# Patient Record
Sex: MALE | Race: WHITE | Employment: FULL TIME | URBAN - METROPOLITAN AREA
[De-identification: names, ages, dates, MRNs, and addresses within clinical notes are randomized per-mention and may not be internally consistent; named-entity substitution may affect disease eponyms.]

---

## 2017-10-10 ENCOUNTER — HOSPITAL ENCOUNTER (OUTPATIENT)
Dept: ULTRASOUND IMAGING | Age: 67
Discharge: HOME | End: 2017-10-10
Attending: FAMILY MEDICINE
Payer: COMMERCIAL

## 2017-10-10 DIAGNOSIS — N50.89 TESTICULAR MASS: ICD-10-CM

## 2017-10-10 PROCEDURE — 76870 US EXAM SCROTUM: CPT

## 2019-02-04 ENCOUNTER — ANESTHESIA (OUTPATIENT)
Dept: ENDOSCOPY | Age: 69
End: 2019-02-04
Payer: MEDICARE

## 2019-02-04 ENCOUNTER — ANESTHESIA EVENT (OUTPATIENT)
Dept: ENDOSCOPY | Age: 69
End: 2019-02-04
Payer: MEDICARE

## 2019-02-04 ENCOUNTER — HOSPITAL ENCOUNTER (OUTPATIENT)
Age: 69
Setting detail: OUTPATIENT SURGERY
Discharge: HOME | End: 2019-02-04
Attending: SPECIALIST | Admitting: SPECIALIST
Payer: MEDICARE

## 2019-02-04 VITALS
WEIGHT: 182 LBS | RESPIRATION RATE: 14 BRPM | TEMPERATURE: 97.8 F | HEIGHT: 71 IN | DIASTOLIC BLOOD PRESSURE: 73 MMHG | HEART RATE: 49 BPM | SYSTOLIC BLOOD PRESSURE: 129 MMHG | BODY MASS INDEX: 25.48 KG/M2 | OXYGEN SATURATION: 97 %

## 2019-02-04 PROCEDURE — 76060000032 HC ANESTHESIA 0.5 TO 1 HR: Performed by: SPECIALIST

## 2019-02-04 PROCEDURE — 74011250637 HC RX REV CODE- 250/637: Performed by: SPECIALIST

## 2019-02-04 PROCEDURE — 74011000250 HC RX REV CODE- 250

## 2019-02-04 PROCEDURE — 74011250636 HC RX REV CODE- 250/636

## 2019-02-04 PROCEDURE — 77030027957 HC TBNG IRR ENDOGTR BUSS -B: Performed by: SPECIALIST

## 2019-02-04 PROCEDURE — 76040000007: Performed by: SPECIALIST

## 2019-02-04 PROCEDURE — 88305 TISSUE EXAM BY PATHOLOGIST: CPT

## 2019-02-04 PROCEDURE — 77030013992 HC SNR POLYP ENDOSC BSC -B: Performed by: SPECIALIST

## 2019-02-04 RX ORDER — MIDAZOLAM HYDROCHLORIDE 1 MG/ML
.25-1 INJECTION, SOLUTION INTRAMUSCULAR; INTRAVENOUS
Status: DISCONTINUED | OUTPATIENT
Start: 2019-02-04 | End: 2019-02-04 | Stop reason: HOSPADM

## 2019-02-04 RX ORDER — EPINEPHRINE 0.1 MG/ML
1 INJECTION INTRACARDIAC; INTRAVENOUS
Status: DISCONTINUED | OUTPATIENT
Start: 2019-02-04 | End: 2019-02-04 | Stop reason: HOSPADM

## 2019-02-04 RX ORDER — FENTANYL CITRATE 50 UG/ML
200 INJECTION, SOLUTION INTRAMUSCULAR; INTRAVENOUS
Status: DISCONTINUED | OUTPATIENT
Start: 2019-02-04 | End: 2019-02-04 | Stop reason: HOSPADM

## 2019-02-04 RX ORDER — SODIUM CHLORIDE 9 MG/ML
INJECTION, SOLUTION INTRAVENOUS
Status: DISCONTINUED | OUTPATIENT
Start: 2019-02-04 | End: 2019-02-04 | Stop reason: HOSPADM

## 2019-02-04 RX ORDER — LORAZEPAM 2 MG/ML
2 INJECTION INTRAMUSCULAR AS NEEDED
Status: DISCONTINUED | OUTPATIENT
Start: 2019-02-04 | End: 2019-02-04 | Stop reason: HOSPADM

## 2019-02-04 RX ORDER — SODIUM CHLORIDE 0.9 % (FLUSH) 0.9 %
5-40 SYRINGE (ML) INJECTION EVERY 8 HOURS
Status: DISCONTINUED | OUTPATIENT
Start: 2019-02-04 | End: 2019-02-04 | Stop reason: HOSPADM

## 2019-02-04 RX ORDER — FLUMAZENIL 0.1 MG/ML
0.2 INJECTION INTRAVENOUS
Status: DISCONTINUED | OUTPATIENT
Start: 2019-02-04 | End: 2019-02-04 | Stop reason: HOSPADM

## 2019-02-04 RX ORDER — SODIUM CHLORIDE 9 MG/ML
50 INJECTION, SOLUTION INTRAVENOUS CONTINUOUS
Status: DISCONTINUED | OUTPATIENT
Start: 2019-02-04 | End: 2019-02-04 | Stop reason: HOSPADM

## 2019-02-04 RX ORDER — DUTASTERIDE 0.5 MG/1
0.5 CAPSULE, LIQUID FILLED ORAL DAILY
COMMUNITY

## 2019-02-04 RX ORDER — GLYCOPYRROLATE 0.2 MG/ML
INJECTION INTRAMUSCULAR; INTRAVENOUS AS NEEDED
Status: DISCONTINUED | OUTPATIENT
Start: 2019-02-04 | End: 2019-02-04 | Stop reason: HOSPADM

## 2019-02-04 RX ORDER — ATROPINE SULFATE 0.1 MG/ML
0.5 INJECTION INTRAVENOUS
Status: DISCONTINUED | OUTPATIENT
Start: 2019-02-04 | End: 2019-02-04 | Stop reason: HOSPADM

## 2019-02-04 RX ORDER — SODIUM CHLORIDE 0.9 % (FLUSH) 0.9 %
5-40 SYRINGE (ML) INJECTION AS NEEDED
Status: DISCONTINUED | OUTPATIENT
Start: 2019-02-04 | End: 2019-02-04 | Stop reason: HOSPADM

## 2019-02-04 RX ORDER — NALOXONE HYDROCHLORIDE 0.4 MG/ML
0.4 INJECTION, SOLUTION INTRAMUSCULAR; INTRAVENOUS; SUBCUTANEOUS
Status: DISCONTINUED | OUTPATIENT
Start: 2019-02-04 | End: 2019-02-04 | Stop reason: HOSPADM

## 2019-02-04 RX ORDER — PROPOFOL 10 MG/ML
INJECTION, EMULSION INTRAVENOUS AS NEEDED
Status: DISCONTINUED | OUTPATIENT
Start: 2019-02-04 | End: 2019-02-04 | Stop reason: HOSPADM

## 2019-02-04 RX ORDER — DEXTROMETHORPHAN/PSEUDOEPHED 2.5-7.5/.8
1.2 DROPS ORAL
Status: DISCONTINUED | OUTPATIENT
Start: 2019-02-04 | End: 2019-02-04 | Stop reason: HOSPADM

## 2019-02-04 RX ADMIN — PROPOFOL 20 MG: 10 INJECTION, EMULSION INTRAVENOUS at 07:46

## 2019-02-04 RX ADMIN — SODIUM CHLORIDE: 9 INJECTION, SOLUTION INTRAVENOUS at 07:34

## 2019-02-04 RX ADMIN — PROPOFOL 30 MG: 10 INJECTION, EMULSION INTRAVENOUS at 07:40

## 2019-02-04 RX ADMIN — PROPOFOL 30 MG: 10 INJECTION, EMULSION INTRAVENOUS at 07:49

## 2019-02-04 RX ADMIN — PROPOFOL 100 MG: 10 INJECTION, EMULSION INTRAVENOUS at 07:34

## 2019-02-04 RX ADMIN — PROPOFOL 20 MG: 10 INJECTION, EMULSION INTRAVENOUS at 07:44

## 2019-02-04 RX ADMIN — PROPOFOL 30 MG: 10 INJECTION, EMULSION INTRAVENOUS at 07:42

## 2019-02-04 RX ADMIN — GLYCOPYRROLATE 0.2 MG: 0.2 INJECTION INTRAMUSCULAR; INTRAVENOUS at 07:44

## 2019-02-04 RX ADMIN — PROPOFOL 40 MG: 10 INJECTION, EMULSION INTRAVENOUS at 07:37

## 2019-02-04 NOTE — ANESTHESIA POSTPROCEDURE EVALUATION
Post-Anesthesia Evaluation and Assessment Patient: Collette Lassiter. MRN: [id number]  SSN: [id number] YOB: 1950  Age: 80 y.o. Sex: male I have evaluated the patient and they are stable and ready for discharge from the PACU. Cardiovascular Function/Vital Signs Visit Vitals /64 Pulse (!) 53 Temp 36.8 °C (98.3 °F) Resp 14 Ht 5' 11\" (1.803 m) Wt 82.6 kg (182 lb) SpO2 100% BMI 25.38 kg/m² Patient is status post MAC anesthesia for Procedure(s): 
COLONOSCOPY 
ENDOSCOPIC POLYPECTOMY. Nausea/Vomiting: None Postoperative hydration reviewed and adequate. Pain: 
Pain Scale 1: Numeric (0 - 10) (02/04/19 8988) Pain Intensity 1: 0 (02/04/19 8428) Managed Neurological Status: At baseline Mental Status, Level of Consciousness: Alert and  oriented to person, place, and time Pulmonary Status:  
O2 Device: Nasal cannula (02/04/19 0752) Adequate oxygenation and airway patent Complications related to anesthesia: None Post-anesthesia assessment completed. No concerns Signed By: Setve Krishna MD   
 February 4, 2019 Procedure(s): 
COLONOSCOPY 
ENDOSCOPIC POLYPECTOMY. 
 
<BSHSIANPOST> Visit Vitals /64 Pulse (!) 53 Temp 36.8 °C (98.3 °F) Resp 14 Ht 5' 11\" (1.803 m) Wt 82.6 kg (182 lb) SpO2 100% BMI 25.38 kg/m²

## 2019-02-04 NOTE — PROGRESS NOTES

## 2019-02-04 NOTE — ANESTHESIA PREPROCEDURE EVALUATION
Anesthetic History No history of anesthetic complications Review of Systems / Medical History Patient summary reviewed, nursing notes reviewed and pertinent labs reviewed Pulmonary Within defined limits Neuro/Psych Within defined limits Cardiovascular Hypertension Exercise tolerance: >4 METS 
  
GI/Hepatic/Renal 
  
 
 
 
 
 
 Endo/Other Arthritis Other Findings Physical Exam 
 
Airway Mallampati: I 
TM Distance: > 6 cm Neck ROM: normal range of motion Mouth opening: Normal 
 
 Cardiovascular Rhythm: regular Rate: normal 
 
 
 
 Dental 
No notable dental hx Pulmonary Breath sounds clear to auscultation Abdominal 
 
 
 
 Other Findings Anesthetic Plan ASA: 2 Anesthesia type: MAC Induction: Intravenous Anesthetic plan and risks discussed with: Patient

## 2019-02-04 NOTE — DISCHARGE INSTRUCTIONS
Yana Gar.  [id number]  1950    COLON DISCHARGE INSTRUCTIONS  Discomfort:  Redness at IV site- apply warm compress to area; if redness or soreness persist- contact your physician  There may be a slight amount of blood passed from the rectum  Gaseous discomfort- walking, belching will help relieve any discomfort  You may not operate a vehicle for 12 hours  You may not engage in an occupation involving machinery or appliances for rest of today  You may not drink alcoholic beverages for at least 12 hours  Avoid making any critical decisions for at least 24 hour  DIET:   Regular diet. - however -  remember your colon is empty and a heavy meal will produce gas. Avoid these foods:  vegetables, fried / greasy foods, carbonated drinks for today. ACTIVITY:  You may resume your normal daily activities it is recommended that you spend the remainder of the day resting -  avoid any strenuous activity. CALL M.D. ANY SIGN OF:  Increasing pain, nausea, vomiting  Abdominal distension (swelling)  New increased bleeding (oral or rectal)  Fever (chills)  Pain in chest area  Bloody discharge from nose or mouth  Shortness of breath    You may not  take any Advil, Aspirin, Ibuprofen, Motrin, Aleve, Goodys, or any similar pain or arthritis products for 5 days, ONLY  Tylenol as needed for pain. Follow-up Instructions:   Call Dr. Renetta Rosales  Results of procedure / biopsy in 10-14days   Office telephone for problems or questions [phone]      - Await pathology. - Repeat colonoscopy in 5 years.      - If < 10 years, reason: above average risk patient

## 2019-02-04 NOTE — ROUTINE PROCESS
Gabriel Asia.  1950  [id number]    Situation:  Verbal report received from: AUGUST Guzmán. Procedure: Procedure(s):  COLONOSCOPY  ENDOSCOPIC POLYPECTOMY    Background:    Preoperative diagnosis: PERSONAL HISTORY OF COLON POLYPS  Postoperative diagnosis: 1. Colon Polyp    :  Dr. Maylin Vazquez  Assistant(s): Endoscopy Technician-1: Silver Noel  Endoscopy RN-1: Angie Dc RN    Specimens:   ID Type Source Tests Collected by Time Destination   1 : Hepatic Flexure Colon Polyp Preservative   Bob Roman MD 2/4/2019 7677 Pathology     H. Pylori  no    Assessment:  Intra-procedure medications       Anesthesia gave intra-procedure sedation and medications, see anesthesia flow sheet yes    Intravenous fluids:   300  NS @ KVO     Vital signs stable yes    Abdominal assessment: round and soft yes    Recommendation:  Discharge patient per MD order yes.   Return to floor no  Family or Friend : friend  Permission to share finding with family or friend yes

## 2019-02-04 NOTE — PROCEDURES
Colonoscopy Procedure Note    Indications:   Personal history of colon polyps (screening only)  Referring Physician: Francisco Lucas MD   Anesthesia/Sedation:MAC  Endoscopist:  Dr. Heidy Nevarez  Assistant:  Endoscopy Technician-1: Silvia Majano  Endoscopy RN-1: Atif Lopez RN    Preoperative diagnosis: PERSONAL HISTORY OF COLON POLYPS    Postoperative diagnosis: 1. Colon Polyp      Procedure in Detail:  Informed consent was obtained for the procedure, including sedation. Risks of perforation, hemorrhage, adverse drug reaction, and aspiration were discussed. The patient was placed in the left lateral decubitus position. Based on the pre-procedure assessment, including review of the patient's medical history, medications, allergies, and review of systems, he had been deemed to be an appropriate candidate for moderate sedation; he was therefore sedated with the medications listed above. The patient was monitored continuously with ECG tracing, pulse oximetry, blood pressure monitoring, and direct observations. A rectal examination was performed. The [id number] was inserted into the rectum and advanced under direct vision to the terminal ileum. The quality of the colonic preparation was excellent. A careful inspection was made as the colonoscope was withdrawn, including a retroflexed view of the rectum; findings and interventions are described below. Findings:   Rectum: normal  Sigmoid: normal  Descending Colon: normal  Transverse Colon: normal  Ascending Colon: 3 mm hepatic flexure polyp   Cecum: normal  Terminal Ileum: normal    Specimens:     see above    EBL: None    Complications: None; patient tolerated the procedure well. Recommendations:     - Await pathology. - Repeat colonoscopy in 5 years.      - If < 10 years, reason: above average risk patient    Signed By: Anderson Kimball MD                        February 4, 2019

## 2019-02-04 NOTE — H&P
Pre-endoscopy H and P for Colonoscopy    The patient was seen and examined. Date of last colonoscopy: 2014, Polyps  No      The airway was assessed and documented. The problem list, past medical history, and medications were reviewed. There is no problem list on file for this patient. Social History     Socioeconomic History    Marital status:      Spouse name: Not on file    Number of children: Not on file    Years of education: Not on file    Highest education level: Not on file   Social Needs    Financial resource strain: Not on file    Food insecurity - worry: Not on file    Food insecurity - inability: Not on file    Transportation needs - medical: Not on file   [employer] needs - non-medical: Not on file   Occupational History    Not on file   Tobacco Use    Smoking status: Never Smoker   Substance and Sexual Activity    Alcohol use: Yes     Comment: socially    Drug use: Not on file    Sexual activity: Not on file   Other Topics Concern    Not on file   Social History Narrative    Not on file     Past Medical History:   Diagnosis Date    Enlarged prostate     Hypertension     Rheumatoid arthritis ([facility] [place] [address]      The patient has a family history of na    Prior to Admission Medications   Prescriptions Last Dose Informant Patient Reported? Taking? FOLIC ACID PO 0/7/5956 at Unknown time  Yes Yes   Sig: Take  by mouth. METHOTREXATE 2/3/2019 at Unknown time  Yes Yes   Sig: by Does Not Apply route. SPIRONOLACTONE PO 2/3/2019 at Unknown time  Yes Yes   Sig: Take  by mouth. amlodipine besylate (AMLODIPINE PO) 2/4/2019 at Unknown time  Yes Yes   Sig: Take  by mouth. dutasteride (AVODART PO) 2/3/2019 at Unknown time  Yes Yes   Sig: Take  by mouth.       Facility-Administered Medications: None         The review of systems is:  negative for shortness of breath or chest pain      The heart, lungs and mental status were satisfactory for the administration of MAC sedation and for the procedure. Mallampati score: See Anesthesia. I discussed with the patient the objectives, risks, consequences and alternatives to the procedure. Plan: Endoscopic procedure with MAC sedation.     Chanell Sims MD  2/4/2019  7:30 AM

## 2019-09-26 ENCOUNTER — HOSPITAL ENCOUNTER (OUTPATIENT)
Dept: CT IMAGING | Age: 69
Discharge: HOME | End: 2019-09-26
Attending: INTERNAL MEDICINE
Payer: MEDICARE

## 2019-09-26 DIAGNOSIS — I15.2 ADRENAL HYPERTENSION (HCC): ICD-10-CM

## 2019-09-26 DIAGNOSIS — E26.9 HYPERALDOSTERONISM, UNSPECIFIED (HCC): ICD-10-CM

## 2019-09-26 DIAGNOSIS — E27.9 ADRENAL HYPERTENSION (HCC): ICD-10-CM

## 2019-09-26 PROCEDURE — 74176 CT ABD & PELVIS W/O CONTRAST: CPT

## 2020-02-07 ENCOUNTER — OFFICE VISIT (OUTPATIENT)
Dept: DERMATOLOGY | Facility: AMBULATORY SURGERY CENTER | Age: 70
End: 2020-02-07

## 2020-02-07 DIAGNOSIS — C44.42 SQUAMOUS CELL CARCINOMA OF SCALP: Primary | ICD-10-CM

## 2020-02-07 DIAGNOSIS — Z85.820 PERSONAL HISTORY OF MALIGNANT MELANOMA OF SKIN: ICD-10-CM

## 2020-02-07 RX ORDER — FOLIC ACID 1 MG/1
TABLET ORAL
COMMUNITY
Start: 2017-04-05

## 2020-02-07 RX ORDER — SPIRONOLACTONE 100 MG/1
100 TABLET, FILM COATED ORAL DAILY
COMMUNITY
Start: 2018-10-30

## 2020-02-07 RX ORDER — AMLODIPINE AND OLMESARTAN MEDOXOMIL 10; 20 MG/1; MG/1
TABLET ORAL DAILY
COMMUNITY
Start: 2018-08-20 | End: 2021-10-23

## 2020-02-07 RX ORDER — DUTASTERIDE 0.5 MG/1
CAPSULE, LIQUID FILLED ORAL
COMMUNITY
Start: 2018-08-20 | End: 2020-02-07 | Stop reason: SDUPTHER

## 2020-02-07 RX ORDER — METHOTREXATE 2.5 MG/1
TABLET ORAL
COMMUNITY
Start: 2017-05-10 | End: 2020-02-07 | Stop reason: SDUPTHER

## 2020-02-07 NOTE — PROGRESS NOTES
Josee Zhang. is a 77 y.o. male presents to the office today with the following:  Chief Complaint   Patient presents with    Other     Consult        No Known Allergies  Current Outpatient Medications   Medication Sig    amLODIPine-Olmesartan 10-20 mg tab Take  by mouth daily.  folic acid (FOLVITE) 1 mg tablet TK 1 T PO QD    spironolactone (ALDACTONE) 100 mg tablet Take 100 mg by mouth daily.  dutasteride (AVODART) 0.5 mg capsule Take 0.5 mg by mouth daily.  METHOTREXATE 2.5 mg by Does Not Apply route daily. No current facility-administered medications for this visit. Past Medical History:   Diagnosis Date    Benign prostate hyperplasia     Conn's syndrome (HCC)     Enlarged prostate     Hypertension     Rheumatoid arthritis ([facility] [address]      Past Surgical History:   Procedure Laterality Date    COLONOSCOPY N/A 2/4/2019    COLONOSCOPY performed by Rayray Bui MD at [facility] ENDOSCOPY     Social History     Socioeconomic History    Marital status:      Spouse name: Not on file    Number of children: Not on file    Years of education: Not on file    Highest education level: Not on file   Tobacco Use    Smoking status: Never Smoker    Smokeless tobacco: Never Used   Substance and Sexual Activity    Alcohol use: Yes     Comment: socially     No family history on file. 70-year-old  male presents for consultation prior to undergoing Mohs micrographic surgery to treat a biopsy-proven squamous cell carcinoma on the scalp. The lesion was recently biopsied by Caroline Mayorga MD.  He has not any problems with the biopsy site since that time and his lesion has never otherwise been treated in the past.  This is his first skin cancer, however he has had many lesions frozen in the past.  This lesion was asymptomatic prior to biopsy. He does not take any aspirin or anticoagulants. He does not have hepatitis C or HIV.   He does not have any implanted devices, artificial heart valves or joints. He does not have any allergies to antibiotics or lidocaine. Review of Systems   Constitutional: Negative for chills, fever and weight loss. Eyes: Negative for blurred vision. Respiratory: Negative for shortness of breath. Cardiovascular: Negative for chest pain and leg swelling. Gastrointestinal: Negative for diarrhea, nausea and vomiting. Musculoskeletal: Negative for joint pain. Neurological: Negative for headaches. Endo/Heme/Allergies: Does not bruise/bleed easily. Physical Exam  HENT:      Head: Normocephalic. Pulmonary:      Effort: Pulmonary effort is normal.   Lymphadenopathy:      Head:      Right side of head: No submental, submandibular, preauricular, posterior auricular or occipital adenopathy. Left side of head: No submental, submandibular, preauricular, posterior auricular or occipital adenopathy. Cervical: No cervical adenopathy. Upper Body:      Right upper body: No supraclavicular adenopathy. Left upper body: No supraclavicular adenopathy. Skin:     Comments: On the posterior scalp there is an erythematous macule with a small scaly papule at the center. There are scattered scaly papules on the scalp. Neurological:      Mental Status: He is alert and oriented to person, place, and time. 1. Squamous cell carcinoma of scalp    Given the size, location and indistinct clinical margins of the tumor the appropriate treatment is Mohs micrographic surgery. I discussed the procedure with the patient today including the risks such as bleeding, scar, infection, damage to underlying sensory/motor nerves and blood vessels. We discussed expectations for day of surgery, immediate postop recovery and long-term scar maturation. I outlined the clinical margins of the tumor to show the patient in the mirror, and the patient agrees that the appropriate site is identified.   I also discussed potential repair options including second intention healing and primary closure. Additional reconstructive options that were discussed included referral to facial plastics. All of the patient's questions were answered today and I am happy to speak via telephone regarding any additional questions. 2. Personal history of malignant melanoma of skin  Personal history of skin cancer. I discussed sun protection, sunscreen use, the warning signs of skin cancer, the need for self-skin examinations, and the need for regular physician exams every 6 months. The patient should follow up sooner as needed if new, changing, or symptomatic skin lesions arise. Follow-up and Dispositions    · Return in about 1 month (around 3/7/2020) for Mohs.          Sebastian Rocha MD

## 2020-03-05 ENCOUNTER — OFFICE VISIT (OUTPATIENT)
Dept: DERMATOLOGY | Facility: AMBULATORY SURGERY CENTER | Age: 70
End: 2020-03-05

## 2020-03-05 VITALS — DIASTOLIC BLOOD PRESSURE: 80 MMHG | SYSTOLIC BLOOD PRESSURE: 130 MMHG | HEART RATE: 60 BPM

## 2020-03-05 DIAGNOSIS — C44.42 SQUAMOUS CELL CARCINOMA OF SCALP: Primary | ICD-10-CM

## 2020-03-05 NOTE — PATIENT INSTRUCTIONS
WOUND CARE INSTRUCTIONS 1. Keep the dressing clean and dry and do not remove for 48 hours. 2. Then change the dressing once a day as follows: 
a. Wash hands before and after each dressing change. b. Remove dressing and wash site gently with mild soap and water, rinse, and pat dry. 
c. Apply liberal amounts of an ointment (Petroleum jelly or Aquaphor). d. Apply a non-stick (Telfa) dressing or Band-Aid to cover the wound. 3. Watch for: BLEEDING: A small amount of drainage may occur. If bleeding occurs, elevate and rest the surgery site. Apply gauze and steady pressure for 20 minutes. If bleeding continues repeat pressure once more. If bleeding still does not stop, call this office. If after hours, call Dr. Jeff Perez at [phone]. INFECTION: Signs of infection include increased redness, pain, warmth, drainage of pus, and fever. If this occurs, please call this office. 4. Special Instructions (follow any that are checked): 
· [] You have stitches that DO/ DO NOT need to be removed. · [x] Avoid bending at the waist and heavy lifting for two days. · [x] Sleep with your head elevated for the next two nights. · [] Rest the surgery site and keep it elevated as much as possible for two days. · [] You may apply an ice-pack for 10-15 minutes every waking hour for the rest of the day. · [] Eat a soft diet and avoid hot food and hot drinks for the rest of the day. · [] Other instructions: Follow up as directed. Take Tylenol for pain as needed. Once the site is healed with no remaining bandages or open areas, protect your surgical site and scar from the sun, as this area will be more sensitive. Use a broad spectrum sunscreen SPF 30 or higher daily, and a chemical free product (one containing zinc oxide or titanium dioxide) is a good choice if the area is sensitive.  
 
You may begin to gently massage the surgical site in 3 weeks, rubbing in a circular motion along the scar. This can help reduce swelling and thickness of a scar. If you have any questions or concerns, please call our office Monday through Friday at [phone]. If after hours, please call Dr. Arti Bush at [phone].

## 2020-03-05 NOTE — PROGRESS NOTES
Progress note for Mohs surgery patient:    Chief Complaint:  Squamous cell carcinoma of the Right superior parietal scalp    HPI:  Margarette Rod is a 77y.o. year old male referred by  Kunal Chao MD for Mohs surgery to treat the following lesion:  Lesion Info  Location: Right superior parietal scalp  Size: 0.6 x 0.5 cm  Type: Squamous  Duration: less than one year, patient not certain  Path Lab: [facility] #: [id number]  Prior Treatment: none     Symptoms of the lesion include none. ROS:  Jose J Palomino is feeling well and in their usual state of health today. He is not in pain. He does not have any other skin concerns. Exam:  Jose J Palomino is an awake, alert, oriented, well-appearing male in no distress. The face was examined. Findings are:  On the right superior parietal scalp there is a small pink scar. A/P:  Squamous cell carcinoma of the Right superior parietal scalp. The diagnosis was reviewed. The Mohs surgery procedure was reviewed. Indications, risks, and options were discussed with Mr. Katelynn Rodriguez preoperatively. Risks including, but not limited to: pain, bleeding, infection, tumor recurrence, scarring and damage to motor and/or sensory nerves, were discussed. Mr. Katelynn Rodriguez chose Mohs surgery. Mr. Katelynn Rodriguez was an acceptable surgery candidate. I performed Mohs surgery using standard technique after verbal and written consent were obtained. The lesion was identified and confirmed with the patient and photograph, if available. The surgical site was marked with gentian violet, prepped, draped and anesthetized in standard fashion. The tumor was debulked by curettage and orientation hashes were placed. The tumor and any associated scar was excised using beveled incision. Hemostasis was achieved, the site was bandaged, and the tissue was transported to the Mohs lab.   While maintaining anatomic orientation the tissue was divided, if needed, and marked with colored inks that were noted on the corresponding Mohs map. The tissue was prepared by Mohs en-face technique for fresh frozen section analysis. The resulting slides were examined for residual tumor, scar and other concerns, all of which were marked on the corresponding Mohs map, if present. The Mohs map was used to guide subsequent stages of surgery, if necessary, and the above process was repeated until a tumor-free plane was achieved. Once the tumor was cleared the map was marked as such and signed. Dr. Arleen Vo acted as surgeon and pathologist for the entire case, performing all stages of the surgical excision as well as examination and interpretation of the histologic slides. See table below for details regarding the surgical case. 2 stage(s) were required to reach a tumor-free plane, resulting in a 2.0 x 1.3 cm defect extending to the subcutaneous tissue. There were not complications. Harlan Hives will follow up as needed in the postoperative period. Regular skin examinations will be with  Rhiannon Hidalgo MD.    Due to the very superficial nature of the defect, the decision was made to allow the wound to heal by second intent. Reconstructive alternatives were discussed including primary closure, and the patient understands that he may elect to undergo repair at a later date if desired. Implications of second intention healing were discussed including bleeding, scar, wound contraction, potential for distortion of free margins. Hemostasis was achieved using electrosurgery and pressure. Vaseline and a pressure dressing were applied and the patient was given written and verbal wound care instructions including my cell phone number. He will return in 2 weeks for wound check.             Right superior parietal scalp  Mohs Lesion Operative Report  Date: 03/05/20  Room: PR2  Indications: Poor definition, Site, Size  Pre-op Meds: n/a  Pre-op BP: 122/68  Pre-op pulse: 66  1st Assistant: Jay Jay Wells RN  Stage #: 2  Stage 1 Sections: 1  Stage 1 # Pos: 1  Stage 2 Sections: 2  Stage 2 # POS: 0  Perineural Involvement: No  Lymphadenopathy: No  Defect Size: 2.0 x 1.3 cm  Depth: subcutaneous tissue  Wound Mgt: 2nd intention  Donor Site: n/a  Closure Length: n/a  Estimated Blood Loss: 2 mL  Hemostasis: Electrosurgery, Pressure  Anesthesia: 1% Lidocaine w/1:100,000 epi  1% Lidocaine: 6 cc  Complications: n/a  Dressing: vaseline, telfa, gauze, medipore tape  Post-op BP: 130/80  Post-op Pulse: 60  Pos-op Meds: n/a  W/C Instructions: Verbal, Written  Follow-up: 2 weeks for wound check     [facility]   OFFICE PROCEDURE PROGRESS NOTE   Chart reviewed for the following:   Pinky Sheffield MD have reviewed the History, Physical and updated the Allergic reactions for [employer]. Tom Bowling TIME OUT performed immediately prior to start of procedure:   Pinky Sheffield MD, have performed the following reviews on [employer].   prior to the start of the procedure:     * Patient was identified by name and date of birth   * Agreement on procedure being performed was verified   * Risks and Benefits explained to the patient   * Procedure site verified and marked as necessary   * Patient was positioned for comfort   * Consent was signed and verified     Time: 8 AM  Date of procedure: 3/5/2020  Procedure performed by:  Maxx Beltran MD  Provider assisted by: Florentin Magallon RN  Patient assisted by: self   How tolerated by patient: tolerated the procedure well with no complications   Comments: none

## 2020-03-05 NOTE — LETTER
3/5/2020 4:23 PM 
 
Patient:  Deja Morgan. YOB: 1950 Date of Visit: 3/5/2020 Dear Iban Dockery MD 
[address] [facility] [address] [address] VIA Facsimile: [phone] Thank you for referring Deja Morgan. to me for evaluation/treatment. Below are the relevant portions of my assessment and plan of care. Mr. Alla Levine presented today for Mohs surgery to treat a biopsy-proven squamous cell carcinoma of the right superior parietal scalp.  2 stage(s) of Mohs surgery were required to achieve tumor free margins. The wound was allowed to heal by second intention because of the very superficial nature of the defect. He tolerated the procedure well. Please see the attached procedure note(s) for additional details. Mr. Alla Levine will return to me for suture removal and/or wound checks at an appropriate interval and I will follow-up with him regarding any issues arising from or relating to this surgery. I will otherwise defer any additional dermatologic care back to you. If you have questions, please do not hesitate to call me. I look forward to following Mr. Denson along with you. Sincerely, Pamela Taylor MD 
[phone] (cell)

## 2020-03-19 ENCOUNTER — OFFICE VISIT (OUTPATIENT)
Dept: DERMATOLOGY | Facility: AMBULATORY SURGERY CENTER | Age: 70
End: 2020-03-19

## 2020-03-19 VITALS — TEMPERATURE: 98.4 F

## 2020-03-19 DIAGNOSIS — C44.42 SQUAMOUS CELL CARCINOMA OF SCALP: Primary | ICD-10-CM

## 2020-03-19 NOTE — PROGRESS NOTES
Wound check/suture removal:    Chief complaint: wound check. HPI: Kathrin Brown. presents for wound check following Mohs surgery to treat a biopsy-proven squamous cell carcinoma on the vertex scalp performed about 2 weeks ago. Exam: The surgical site was examined. There is not evidence of infection. There is erythema. There is not edema. A/P:  Wound check. The surgical site is healing well. Additional care was reviewed including liberal application of Vaseline several times daily and gentle scar massage starting at 4-6 weeks postop. Follow up will be as needed.

## 2021-07-10 ENCOUNTER — APPOINTMENT (OUTPATIENT)
Dept: VASCULAR SURGERY | Age: 71
End: 2021-07-10
Attending: EMERGENCY MEDICINE
Payer: MEDICARE

## 2021-07-10 ENCOUNTER — APPOINTMENT (OUTPATIENT)
Dept: GENERAL RADIOLOGY | Age: 71
End: 2021-07-10
Attending: EMERGENCY MEDICINE
Payer: MEDICARE

## 2021-07-10 ENCOUNTER — HOSPITAL ENCOUNTER (EMERGENCY)
Age: 71
Discharge: HOME | End: 2021-07-10
Attending: EMERGENCY MEDICINE | Admitting: EMERGENCY MEDICINE
Payer: MEDICARE

## 2021-07-10 VITALS
DIASTOLIC BLOOD PRESSURE: 74 MMHG | RESPIRATION RATE: 16 BRPM | HEART RATE: 68 BPM | TEMPERATURE: 97.8 F | WEIGHT: 187 LBS | OXYGEN SATURATION: 98 % | BODY MASS INDEX: 26.18 KG/M2 | HEIGHT: 71 IN | SYSTOLIC BLOOD PRESSURE: 138 MMHG
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
## 2021-07-10 DIAGNOSIS — S80.11XA CONTUSION OF RIGHT KNEE AND LOWER LEG, INITIAL ENCOUNTER: ICD-10-CM

## 2021-07-10 DIAGNOSIS — S80.01XA CONTUSION OF RIGHT KNEE AND LOWER LEG, INITIAL ENCOUNTER: ICD-10-CM

## 2021-07-10 PROCEDURE — 93971 EXTREMITY STUDY: CPT

## 2021-07-10 PROCEDURE — 73564 X-RAY EXAM KNEE 4 OR MORE: CPT

## 2021-07-10 PROCEDURE — 99283 EMERGENCY DEPT VISIT LOW MDM: CPT

## 2021-07-10 RX ORDER — KETOROLAC TROMETHAMINE 10 MG/1
10 TABLET, FILM COATED ORAL
Qty: 18 TABLET | Refills: 0 | Status: SHIPPED | OUTPATIENT
Start: 2021-07-10 | End: 2021-10-23
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
## 2021-07-10 NOTE — ED NOTES
I have given crutches to the patient, adjusted them and provided complete instructions on safe use.     Discharge instructions given by Rayna Angeles NP

## 2021-10-23 VITALS — BODY MASS INDEX: 26.04 KG/M2 | HEIGHT: 71 IN | WEIGHT: 186 LBS

## 2021-10-23 PROBLEM — S76.111D: Status: ACTIVE | Noted: 2021-10-23

## 2021-10-23 PROBLEM — Z98.890 S/P RIGHT KNEE ARTHROSCOPY: Status: ACTIVE | Noted: 2021-10-23

## 2021-10-23 PROBLEM — M25.561 RIGHT KNEE PAIN: Status: ACTIVE | Noted: 2021-10-23

## 2021-10-23 PROBLEM — M25.461 EFFUSION OF RIGHT KNEE: Status: ACTIVE | Noted: 2021-10-23

## 2021-11-08 ENCOUNTER — OFFICE VISIT (OUTPATIENT)
Dept: ORTHOPEDIC SURGERY | Age: 71
End: 2021-11-08
Payer: MEDICARE

## 2021-11-08 DIAGNOSIS — M25.561 RIGHT KNEE PAIN, UNSPECIFIED CHRONICITY: ICD-10-CM

## 2021-11-08 DIAGNOSIS — M25.561 PAIN OF RIGHT KNEE AFTER INJURY: ICD-10-CM

## 2021-11-08 DIAGNOSIS — Z98.890 S/P ARTHROSCOPIC SURGERY OF RIGHT KNEE: Primary | ICD-10-CM

## 2021-11-08 PROCEDURE — 97110 THERAPEUTIC EXERCISES: CPT | Performed by: PHYSICAL THERAPIST

## 2021-11-08 PROCEDURE — 97140 MANUAL THERAPY 1/> REGIONS: CPT | Performed by: PHYSICAL THERAPIST

## 2021-11-08 NOTE — PROGRESS NOTES
PT DAILY TREATMENT NOTE    Patient Name: Vivienne Harmon. Date:2021  : 1950  [x]  Patient  Verified  Payor: BLUE CROSS / Plan: [address] / Product Type: Commerical /    In time: 01:50p  Out time:2:55p  Total Treatment Time (min): 65  Total Timed Codes (min): 60  One-on-one Time (min): 60    Treatment Area: R knee    SUBJECTIVE  Pain Level (0-10 scale): 0  Any medication changes, allergies to medications, adverse drug reactions, diagnosis change, or new procedure performed?: [x] No    [] Yes (see summary sheet for update)  Subjective functional status/changes:   [] No changes reported  Patient states that he has been doing well with strengthening exercises. OBJECTIVE  Modality (rationale):   []  E-Stim: type _ x _ min     []att   []unatt   []w/US   []w/ice   []w/heat  []  Traction: []cerv   []pelvic   _ lbs x _ min     []pro   []sup   []int   []const  []  Ultrasound: []cont   []pulse    _ W/cm2 x _  min   []1MHz   []3MHz  []  Iontophoresis: []take home patch w/ dexamethazone    []40mA   []80mA                               []_ mA min w/: []dexamethazone   []other:_  []  Ice pack _  min     [] Hot pack _  min     [] Paraffin _  min  []  Other:     Therapeutic Exercise: [x] see flow sheet     [] Other:_  Added/Changed Exercises:  [x]  Added: Single-leg lunge_  to improve (function): quad strength and ability to climb uphill for hiking. []  Changed:_ to improve (function):  (minutes: )    Therapeutic Activity: N/A  (minutes: )    Manual Therapy: Treatment consisted of STM of distal quad. Scar mobilization of incision. Grade 4 posterior tibiofemoral mobilization for knee flexion range of motion. Passive knee flexion in prone. Patient able to achieve 125 degrees knee flexion in supine. Patient able to achieve 110 degrees knee flexion in prone.       Gait Training: [] _ feet w/ _ device on level surface with _ level of assist  []  Other:_  (minutes: )    Patient Education: [] Review HEP    [] Progressed/Changed HEP based on:_   [] positioning   [] body mechanics   [] transfers   [] heat/ice application    Other Objective/Functional Measures:   Pain Level (0-10 scale) post treatment: 0    ASSESSMENT  []  See Plan of Care  []  See progress note/recertification  [x]  Patient will continue to benefit from skilled therapy to address remaining functional deficits: Patient continues to have knee flexion loss. Patient has not returned to all previous physical activities including hiking.     Progress towards goals / Updated goals:    PLAN  [x]  Upgrade activities as tolerated     []  Continue plan of care  []  Discharge due to:_  [] Other:_      Kory Ca, PT 11/8/2021  2:09 PM

## 2021-11-17 ENCOUNTER — OFFICE VISIT (OUTPATIENT)
Dept: ORTHOPEDIC SURGERY | Age: 71
End: 2021-11-17
Payer: MEDICARE

## 2021-11-17 DIAGNOSIS — Z98.890 S/P ARTHROSCOPIC SURGERY OF RIGHT KNEE: Primary | ICD-10-CM

## 2021-11-17 DIAGNOSIS — M25.561 PAIN OF RIGHT KNEE AFTER INJURY: ICD-10-CM

## 2021-11-17 PROCEDURE — 97140 MANUAL THERAPY 1/> REGIONS: CPT | Performed by: PHYSICAL THERAPIST

## 2021-11-17 PROCEDURE — 97110 THERAPEUTIC EXERCISES: CPT | Performed by: PHYSICAL THERAPIST

## 2021-11-17 NOTE — PROGRESS NOTES
PT DAILY TREATMENT NOTE    Patient Name: Tobias Huang. Date:2021  : 1950  [x]  Patient  Verified  Payor: BLUE CROSS / Plan: [address] / Product Type: Commerical /    In time: 01:50p  Out time:2:55p  Total Treatment Time (min): 65  Total Timed Codes (min): 60  One-on-one Time (min): 60    Treatment Area: R knee    SUBJECTIVE  Pain Level (0-10 scale): 0  Any medication changes, allergies to medications, adverse drug reactions, diagnosis change, or new procedure performed?: [x] No    [] Yes (see summary sheet for update)  Subjective functional status/changes:   [] No changes reported  Patient states that he has been doing well with strengthening exercises. OBJECTIVE  Modality (rationale):   []  E-Stim: type _ x _ min     []att   []unatt   []w/US   []w/ice   []w/heat  []  Traction: []cerv   []pelvic   _ lbs x _ min     []pro   []sup   []int   []const  []  Ultrasound: []cont   []pulse    _ W/cm2 x _  min   []1MHz   []3MHz  []  Iontophoresis: []take home patch w/ dexamethazone    []40mA   []80mA                               []_ mA min w/: []dexamethazone   []other:_  []  Ice pack _  min     [] Hot pack _  min     [] Paraffin _  min  []  Other:     Therapeutic Exercise: [x] see flow sheet     [] Other:_  Added/Changed Exercises:  []  Added: to improve (function):  []  Changed:_ to improve (function):  (minutes: 35 )      PT Exercise Log         Activity/Exercise Date  21    Activity/Exercise      Bike   X      RD L  X     Slant board   X       Balance board-single-leg  X     Leg press  X     Single-leg bridge  X   Lateral walk with green Thera-Band at ankles   X   Hip series   X     Step ups with bungee cord  X   Sliders   X   Single-leg squat with hand-held support   X               Manual Therapy: Treatment consisted of STM of distal quad. Scar mobilization of incision. Grade 4 posterior tibiofemoral mobilization for knee flexion range of motion.   Passive knee flexion in prone. Patient able to achieve 125 degrees knee flexion in supine. Patient able to achieve 110 degrees knee flexion in prone. (25 minutes)    Patient Education: [] Review HEP    [] Progressed/Changed HEP based on:_   [] positioning   [] body mechanics   [] transfers   [] heat/ice application    ASSESSMENT  []  See Plan of Care  []  See progress note/recertification  [x]  Patient will continue to benefit from skilled therapy to address remaining functional deficits: Patient continues to have knee flexion loss. Patient has not returned to all previous physical activities including hiking. Patient's knee flexion posttreatment was 135 degrees. Left lower extremity knee flexion is 145 degrees. ICD-10-CM ICD-9-CM    1. S/P arthroscopic surgery of right knee  Z98.890 V45.89    2. Pain of right knee after injury  M25.561 719.46      959.7      Progress towards goals / Updated goals:    PLAN  [x]  Upgrade activities as tolerated     []  Continue plan of care  []  Discharge due to:_  [] Other:_      Follow up next week.      Jaspal Chauhan, PT 11/17/2021  2:09 PM

## 2021-11-19 ENCOUNTER — OFFICE VISIT (OUTPATIENT)
Dept: ORTHOPEDIC SURGERY | Age: 71
End: 2021-11-19
Payer: MEDICARE

## 2021-11-19 VITALS — HEIGHT: 71 IN | WEIGHT: 185 LBS | BODY MASS INDEX: 25.9 KG/M2

## 2021-11-19 DIAGNOSIS — M25.569 KNEE PAIN, UNSPECIFIED CHRONICITY, UNSPECIFIED LATERALITY: Primary | ICD-10-CM

## 2021-11-19 PROCEDURE — 99213 OFFICE O/P EST LOW 20 MIN: CPT | Performed by: ORTHOPAEDIC SURGERY

## 2021-11-19 PROCEDURE — G8419 CALC BMI OUT NRM PARAM NOF/U: HCPCS | Performed by: ORTHOPAEDIC SURGERY

## 2021-11-19 PROCEDURE — G8536 NO DOC ELDER MAL SCRN: HCPCS | Performed by: ORTHOPAEDIC SURGERY

## 2021-11-19 PROCEDURE — G8427 DOCREV CUR MEDS BY ELIG CLIN: HCPCS | Performed by: ORTHOPAEDIC SURGERY

## 2021-11-19 PROCEDURE — G8432 DEP SCR NOT DOC, RNG: HCPCS | Performed by: ORTHOPAEDIC SURGERY

## 2021-11-19 PROCEDURE — 3017F COLORECTAL CA SCREEN DOC REV: CPT | Performed by: ORTHOPAEDIC SURGERY

## 2021-11-19 PROCEDURE — 1101F PT FALLS ASSESS-DOCD LE1/YR: CPT | Performed by: ORTHOPAEDIC SURGERY

## 2021-11-19 RX ORDER — AMLODIPINE AND OLMESARTAN MEDOXOMIL 10; 20 MG/1; MG/1
TABLET ORAL
COMMUNITY

## 2021-11-19 RX ORDER — METHOTREXATE 2.5 MG/1
TABLET ORAL
COMMUNITY
Start: 2021-08-20

## 2021-11-19 NOTE — PROGRESS NOTES
SUBJECTIVE/OBJECTIVE:  Adan Barth (: 1950) is a 80 y.o. male, patient,here for evaluation of the right knee. He has been attending physical therapy regularly. He denies any numbness or tingling erythema or warmth. Denies any fever chills or night sweats. He is continue to ice and elevate. Is been back to riding his bike. Is been hiking quite a bit. He denies any reinjury. Reports his knee feels tight but he denies any swelling. Physical Exam    Examination of his knee reveals that he has full extension to 130 degrees of flexion. His incisions well-healed. His quadricep strength coming along. His knee is stable. He has patellofemoral arthrofibrosis. He is neurovascular intact distally. Imaging:    I have reviewed the patient´s previous diagnostic tests in an effort to support the diagnosis and treatment options. ASSESSMENT/PLAN:  Below is the assessment and plan developed based on review of pertinent history, physical exam, labs, studies, and medications. 1. Knee pain, unspecified chronicity, unspecified laterality      Return if symptoms worsen or fail to improve. We will continue in physical therapy. Continue to ice and elevate as well as take [employer] for medications. We will continue him with a home exercise program as well as physical therapy. We talked about the fact that if he continue make progress I do not think he needs a follow-up. Happy to see him back in the future. He understands that he needs to stick with stretching.     Allergies   Allergen Reactions    Sulfa (Sulfonamide Antibiotics) Unknown (comments)       Current Outpatient Medications   Medication Sig    amLODIPine-Olmesartan 10-20 mg tab amlodipine 10 mg-olmesartan 20 mg tablet   TAKE 1 TABLET BY MOUTH EVERY DAY    methotrexate (RHEUMATREX) 2.5 mg tablet TAKE 5 TABLETS BY MOUTH ONCE WEEKLY ON THE SAME DAY    folic acid (FOLVITE) 1 mg tablet TK 1 T PO QD    spironolactone (ALDACTONE) 100 mg tablet Take 100 mg by mouth daily.  dutasteride (AVODART) 0.5 mg capsule Take 0.5 mg by mouth daily. No current facility-administered medications for this visit. Past Medical History:   Diagnosis Date    Benign prostate hyperplasia     Conn's syndrome ([facility] [address]     Effusion of right knee 10/23/2021    Enlarged prostate     Hypertension     Rheumatoid arthritis ([facility] [address]     Right knee pain 10/23/2021    S/P right knee arthroscopy 10/23/2021    Skin cancer     Sun-damaged skin     Sunburn, blistering     Tendon rupture, traumatic. quadriceps, right, subsequent encounter 10/23/2021       Past Surgical History:   Procedure Laterality Date    COLONOSCOPY N/A 2/4/2019    COLON[facility] performed by Ciro Hooker [facility] at [facility] ENDOSCOPY    HX KNEE ARTHROSCOPY Right        Family History   Problem Relation Age of Onset    Asthma Mother     Asthma Father     Heart Disease Father        Social History     Socioeconomic History    Marital status:      Spouse name: Not on file    Number of children: Not on file    Years of education: Not on file    Highest education level: Not on file   Occupational History    Not on file   Tobacco Use    Smoking status: Never Smoker    Smokeless tobacco: Never Used   Vaping Use    Vaping Use: Never used   Substance and Sexual Activity    Alcohol use: Yes     Alcohol/week: 1.0 standard drink     Types: 1 Cans of beer per week     Comment: socially    Drug use: Never    Sexual activity: Not on file   Other Topics Concern    Not on file   Social History Narrative    Not on file     Social Determinants of Health     Financial Resource Strain:     Difficulty of Paying Living Expenses: Not on file   Food Insecurity:     Worried About Running Out of Food in the Last Year: Not on file    [employer] Food in the Last Year: Not on file   Transportation Needs:     Lack of Transportation (Medical):  Not on file    Lack of Transportation (Non-Medical): Not on file   Physical Activity:     Days of Exercise per Week: Not on file    Minutes of Exercise per Session: Not on file   Stress:     Feeling of Stress : Not on file   Social Connections:     Frequency of Communication with Friends and Family: Not on file    Frequency of Social Gatherings with Friends and Family: Not on file    Attends [employer] Services: Not on file    Active Member of [address] [place] [address] or Organizations: Not on file    Attends Club or Organization Meetings: Not on file    Marital Status: Not on file   Intimate Partner Violence:     Fear of Current or Ex-Partner: Not on file    Emotionally Abused: Not on file    Physically Abused: Not on file    Sexually Abused: Not on file   Housing Stability:     Unable to Pay for Housing in the Last Year: Not on file    Number of [place] in the Last Year: Not on file    Unstable Housing in the Last Year: Not on file       Review of Systems    No flowsheet data found. Vitals:  Ht 5' 11\" (1.803 m)   Wt 185 lb (83.9 kg)   BMI 25.80 kg/m²    Body mass index is 25.8 kg/m². An electronic signature was used to authenticate this note.   -- Nicolasa Moore MD

## 2021-11-24 ENCOUNTER — OFFICE VISIT (OUTPATIENT)
Dept: ORTHOPEDIC SURGERY | Age: 71
End: 2021-11-24
Payer: MEDICARE

## 2021-11-24 DIAGNOSIS — M25.561 RIGHT KNEE PAIN, UNSPECIFIED CHRONICITY: ICD-10-CM

## 2021-11-24 DIAGNOSIS — Z98.890 S/P ARTHROSCOPIC SURGERY OF RIGHT KNEE: Primary | ICD-10-CM

## 2021-11-24 PROCEDURE — 97110 THERAPEUTIC EXERCISES: CPT | Performed by: PHYSICAL THERAPIST

## 2021-11-24 PROCEDURE — 97140 MANUAL THERAPY 1/> REGIONS: CPT | Performed by: PHYSICAL THERAPIST

## 2021-11-24 NOTE — PROGRESS NOTES
PT DAILY TREATMENT NOTE    Patient Name: Brad Yonuger. Date:2021  : 1950  [x]  Patient  Verified  Payor: Jus Joseph / Plan: [place] / Product Type: Medicare /    In time: 01:50p  Out time:2:55p  Total Treatment Time (min): 65  Total Timed Codes (min): 65  One-on-one Time (min): 65    Treatment Area: R knee    SUBJECTIVE  Subjective functional status/changes:   [] No changes reported  Patient states that his knee has been doing very well overall but his primary goal is to be able to bend it further. OBJECTIVE  Modality (rationale):   []  E-Stim: type _ x _ min     []att   []unatt   []w/US   []w/ice   []w/heat  []  Traction: []cerv   []pelvic   _ lbs x _ min     []pro   []sup   []int   []const  []  Ultrasound: []cont   []pulse    _ W/cm2 x _  min   []1MHz   []3MHz  []  Iontophoresis: []take home patch w/ dexamethazone    []40mA   []80mA                               []_ mA min w/: []dexamethazone   []other:_  [x]  Ice pack 10 min     [] Hot pack _  min     [] Paraffin _  min  []  Other:     Therapeutic Exercise: [x] see flow sheet     [] Other:_  Added/Changed Exercises:  []  Added: to improve (function):  []  Changed:_ to improve (function):  (minutes: 40)      PT Exercise Log         Activity/Exercise Date  21    Activity/Exercise      Bike   X      RDL  X     Slantboard   X       Balance board-single-leg  X     Leg press- SL (70), DBL (140)  X     Single-leg bridge  X   Lateral walk with green Thera-Band at ankles   X   Hip series   X     Step ups with bungee cord  X   Sliders   X   Single-leg squat with hand-held support   X               Manual Therapy: Treatment consisted of STM of distal quad. Scar mobilization of incision. Grade 4 posterior tibiofemoral mobilization for knee flexion range of motion. Passive knee flexion in prone. Patient able to achieve 125 degrees knee flexion in supine. Patient able to achieve 110 degrees knee flexion in prone.  (25 minutes)    Patient Education: [] Review HEP    [] Progressed/Changed HEP based on:_   [] positioning   [] body mechanics   [] transfers   [] heat/ice application    ASSESSMENT  []  See Plan of Care  []  See progress note/recertification  [x]  Patient will continue to benefit from skilled therapy to address remaining functional deficits:     Patient has made good progress in therapy. Current right knee range of motion 0 to 135 degrees. ICD-10-CM ICD-9-CM    1. S/P arthroscopic surgery of right knee  Z98.890 V45.89    2. Right knee pain, unspecified chronicity  M25.561 719.46      Progress towards goals / Updated goals:    PLAN  [x]  Upgrade activities as tolerated     []  Continue plan of care  []  Discharge due to:_  [] Other:_      Focus on achieving endrange knee flexion range of motion and restoring quad strength.     June Carrillo, PT 11/24/2021  2:09 PM

## 2021-11-30 ENCOUNTER — OFFICE VISIT (OUTPATIENT)
Dept: ORTHOPEDIC SURGERY | Age: 71
End: 2021-11-30
Payer: MEDICARE

## 2021-11-30 DIAGNOSIS — Z98.890 S/P ARTHROSCOPIC SURGERY OF RIGHT KNEE: Primary | ICD-10-CM

## 2021-11-30 DIAGNOSIS — M25.561 RIGHT KNEE PAIN, UNSPECIFIED CHRONICITY: ICD-10-CM

## 2021-11-30 PROCEDURE — 97110 THERAPEUTIC EXERCISES: CPT | Performed by: PHYSICAL THERAPIST

## 2021-11-30 PROCEDURE — 97140 MANUAL THERAPY 1/> REGIONS: CPT | Performed by: PHYSICAL THERAPIST

## 2021-11-30 NOTE — PROGRESS NOTES
PT DAILY TREATMENT NOTE    Patient Name: Jose Sidhu. Date:2021  : 1950  [x]  Patient  Verified  Payor: [place] MEDICARE / Plan: [place] MEDICARE PART A & B / Product Type: Medicare /    In time: 01:50p  Out time:2:55p  Total Treatment Time (min): 60  Total Timed Codes (min): 60  One-on-one Time (min): 35    Treatment Area: R knee    SUBJECTIVE  Subjective functional status/changes:   [] No changes reported  Patient states that his knee has been doing very well overall but his primary goal is to be able to bend it further. OBJECTIVE  Modality (rationale):   []  E-Stim: type _ x _ min     []att   []unatt   []w/US   []w/ice   []w/heat  []  Traction: []cerv   []pelvic   _ lbs x _ min     []pro   []sup   []int   []const  []  Ultrasound: []cont   []pulse    _ W/cm2 x _  min   []1MHz   []3MHz  []  Iontophoresis: []take home patch w/ dexamethazone    []40mA   []80mA                               []_ mA min w/: []dexamethazone   []other:_  [x]  Ice pack 10 min     [] Hot pack _  min     [] Paraffin _  min  []  Other:     Therapeutic Exercise: [x] see flow sheet     [] Other:_  Added/Changed Exercises:  []  Added: to improve (function):  []  Changed:_ to improve (function):      PT Exercise Log         Activity/Exercise Date  21    Activity/Exercise      Bike   X      RDL- 3#  X     Slantboard   X       Balance board-single-leg  X     Leg press- SL (70), DBL (140)  X     Single-leg bridge  X   Lateral walk with green Thera-Band at ankles   X   Hip series   X     Step ups with bungee cord  X   Sliders   X   Single-leg squat with hand-held support   X   Squats on BOSU X           Manual Therapy: Treatment consisted of STM of distal quad. Scar mobilization of incision. Grade 4 posterior tibiofemoral mobilization for knee flexion range of motion. Passive knee flexion in prone. Patient able to achieve 125 degrees knee flexion in supine. Patient able to achieve 110 degrees knee flexion in prone.  (25 minutes)    Patient Education: [] Review HEP    [] Progressed/Changed HEP based on:_   [] positioning   [] body mechanics   [] transfers   [] heat/ice application    ASSESSMENT  []  See Plan of Care  []  See progress note/recertification  [x]  Patient will continue to benefit from skilled therapy to address remaining functional deficits:     Patient has made good progress in therapy. Current right knee range of motion 0 to 135 degrees. Patient does continue to present with decreased eccentric quad control. ICD-10-CM ICD-9-CM    1. S/P arthroscopic surgery of right knee  Z98.890 V45.89    2. Right knee pain, unspecified chronicity  M25.561 719.46      Progress towards goals / Updated goals:    PLAN  [x]  Upgrade activities as tolerated     []  Continue plan of care  []  Discharge due to:_  [] Other:_      Focus on achieving endrange knee flexion range of motion and restoring eccentric quad control. Plan to discharge to independent HEP at next session.      Francois Hart, PT 11/30/2021  2:09 PM

## 2021-12-07 ENCOUNTER — OFFICE VISIT (OUTPATIENT)
Dept: ORTHOPEDIC SURGERY | Age: 71
End: 2021-12-07
Payer: MEDICARE

## 2021-12-07 DIAGNOSIS — M25.561 RIGHT KNEE PAIN, UNSPECIFIED CHRONICITY: ICD-10-CM

## 2021-12-07 DIAGNOSIS — Z98.890 S/P ARTHROSCOPIC SURGERY OF RIGHT KNEE: Primary | ICD-10-CM

## 2021-12-07 PROCEDURE — 97140 MANUAL THERAPY 1/> REGIONS: CPT | Performed by: PHYSICAL THERAPIST

## 2021-12-07 PROCEDURE — 97110 THERAPEUTIC EXERCISES: CPT | Performed by: PHYSICAL THERAPIST

## 2021-12-07 NOTE — PROGRESS NOTES
PT DAILY TREATMENT NOTE    Patient Name: Marianne Recio. Date:2021  : 1950  [x]  Patient  Verified  Payor: [place] MED[place] / Plan: [place] MEDICARE [place] A & B / Product Type: Medicare /    In time: 01:50p  Out time:2:55p  Total Treatment Time (min): 60  Total Timed Codes (min): 60  One-on-one Time (min): 35    Treatment Area: R knee    SUBJECTIVE  Subjective functional status/changes:   [] No changes reported  Patient states his knee is doing well with strengthening and activity but would like to get more flexibility. OBJECTIVE  Modality (rationale):   []  E-Stim: type _ x _ min     []att   []unatt   []w/US   []w/ice   []w/heat  []  Traction: []cerv   []pelvic   _ lbs x _ min     []pro   []sup   []int   []const  []  Ultrasound: []cont   []pulse    _ W/cm2 x _  min   []1MHz   []3MHz  []  Iontophoresis: []take home patch w/ dexamethazone    []40mA   []80mA                               []_ mA min w/: []dexamethazone   []other:_  [x]  Ice pack 10 min     [] Hot pack _  min     [] Paraffin _  min  []  Other:     Therapeutic Exercise: [x] see flow sheet     [] Other:_  Added/Changed Exercises:  []  Added: to improve (function):  []  Changed:_ to improve (function):      PT Exercise Log         Activity/Exercise Date  21    Activity/Exercise      Bike   X      RDL- 3#  X     Slantboard   X       Balance board-single-leg  X     Leg press- SL (70), DBL (140)  X     Single-leg bridge  X   Lateral walk with green Thera-Band at ankles   X   Hip series   X     Step ups with bungee cord  X   Sliders   X   Single-leg squat with hand-held support   X   Squats on BOSU X           Manual Therapy: Treatment consisted of STM of distal quad. Scar mobilization of incision. Grade 4 posterior tibiofemoral mobilization for knee flexion range of motion. Passive knee flexion in prone. Patient able to achieve 125 degrees knee flexion in supine. Patient able to achieve 110 degrees knee flexion in prone.  (25 minutes)    Patient Education: [] Review HEP    [] Progressed/Changed HEP based on:_   [] positioning   [] body mechanics   [] transfers   [] heat/ice application    ASSESSMENT  []  See Plan of Care  []  See progress note/recertification  [x]  Patient will continue to benefit from skilled therapy to address remaining functional deficits:     Patient has made good progress in therapy. Current right knee range of motion 0 to 135 degrees. Patient is able to demonstrate 145 degrees knee flexion on LLE. ICD-10-CM ICD-9-CM    1. S/P arthroscopic surgery of right knee  Z98.890 V45.89    2. Right knee pain, unspecified chronicity  M25.561 719.46      Progress towards goals / Updated goals:    PLAN  [x]  Upgrade activities as tolerated     []  Continue plan of care  []  Discharge due to:_  [] Other:_      Continue to focus on achieving full endrange right knee flexion. Plan to discharge to updated home exercise program at next session.     Stacy Richardson, PT 12/7/2021  2:09 PM

## 2021-12-15 ENCOUNTER — OFFICE VISIT (OUTPATIENT)
Dept: ORTHOPEDIC SURGERY | Age: 71
End: 2021-12-15
Payer: MEDICARE

## 2021-12-15 DIAGNOSIS — Z98.890 S/P ARTHROSCOPIC SURGERY OF RIGHT KNEE: Primary | ICD-10-CM

## 2021-12-15 DIAGNOSIS — M25.561 RIGHT KNEE PAIN, UNSPECIFIED CHRONICITY: ICD-10-CM

## 2021-12-15 PROCEDURE — 97110 THERAPEUTIC EXERCISES: CPT | Performed by: PHYSICAL THERAPIST

## 2021-12-15 PROCEDURE — 97140 MANUAL THERAPY 1/> REGIONS: CPT | Performed by: PHYSICAL THERAPIST

## 2021-12-16 NOTE — PROGRESS NOTES
PT DAILY TREATMENT NOTE    Patient Name: Rahat Toro. Date:2021  : 1950  [x]  Patient  Verified  Payor: Natividad Joshi / Plan: [place] MEDICARE PART A & B / Product Type: Medicare /    In time: 01:50p  Out time:2:55p  Total Treatment Time (min): 60  Total Timed Codes (min): 60  One-on-one Time (min): 35    Treatment Area: R knee    SUBJECTIVE  Subjective functional status/changes:   [] No changes reported  Patient states his knee is doing well with strengthening and activity but would like to get more flexibility. OBJECTIVE  Modality (rationale):   []  E-Stim: type _ x _ min     []att   []unatt   []w/US   []w/ice   []w/heat  []  Traction: []cerv   []pelvic   _ lbs x _ min     []pro   []sup   []int   []const  []  Ultrasound: []cont   []pulse    _ W/cm2 x _  min   []1MHz   []3MHz  []  Iontophoresis: []take home patch w/ dexamethazone    []40mA   []80mA                               []_ mA min w/: []dexamethazone   []other:_  [x]  Ice pack 10 min     [] Hot pack _  min     [] Paraffin _  min  []  Other:     Therapeutic Exercise: [x] see flow sheet     [] Other:_  Added/Changed Exercises:  []  Added: to improve (function):  []  Changed:_ to improve (function):      PT Exercise Log         Activity/Exercise Date  21    Activity/Exercise      Bike   X      RDL- 3#  X     Slantboard   X       Balance board-single-leg  X     Leg press- SL (70), DBL (140)  X     Single-leg bridge  X   Lateral walk with green Thera-Band at ankles   X   Hip series   X     Step ups with bungee cord  X   Sliders   X   Single-leg squat with hand-held support   X   Squats on BOSU X           Manual Therapy: Treatment consisted of STM of distal quad. Scar mobilization of incision. Grade 4 posterior tibiofemoral mobilization for knee flexion range of motion. Passive knee flexion in prone. Patient able to achieve 125 degrees knee flexion in supine. Patient able to achieve 110 degrees knee flexion in prone.  (25 minutes)    Patient Education: [] Review HEP    [] Progressed/Changed HEP based on:_   [] positioning   [] body mechanics   [] transfers   [] heat/ice application    ASSESSMENT  []  See Plan of Care  []  See progress note/recertification  [x]  Patient will continue to benefit from skilled therapy to address remaining functional deficits:     Patient has made good progress in therapy. Current right knee range of motion 0 to 140 degrees. Right knee extension strength 5/5. Patient has no functional limitations. ICD-10-CM ICD-9-CM    1. S/P arthroscopic surgery of right knee  Z98.890 V45.89    2. Right knee pain, unspecified chronicity  M25.561 719.46      Progress towards goals / Updated goals:    PLAN  []  Upgrade activities as tolerated     []  Continue plan of care  [x]  Discharge due to:_  [] Other:_      Patient has achieved all functional goals. Plan to discharge patient to independent home program at this time.     Barry Terrazas, PT 12/16/2021  2:09 PM

## 2022-03-18 PROBLEM — M25.561 RIGHT KNEE PAIN: Status: ACTIVE | Noted: 2021-10-23

## 2022-03-19 PROBLEM — M25.461 EFFUSION OF RIGHT KNEE: Status: ACTIVE | Noted: 2021-10-23

## 2022-03-19 PROBLEM — S76.111D: Status: ACTIVE | Noted: 2021-10-23

## 2022-03-20 PROBLEM — Z98.890 S/P RIGHT KNEE ARTHROSCOPY: Status: ACTIVE | Noted: 2021-10-23

## 2023-05-12 RX ORDER — AMLODIPINE AND OLMESARTAN MEDOXOMIL 10; 20 MG/1; MG/1
TABLET ORAL
COMMUNITY

## 2023-05-12 RX ORDER — DUTASTERIDE 0.5 MG/1
0.5 CAPSULE, LIQUID FILLED ORAL DAILY
COMMUNITY

## 2023-05-12 RX ORDER — FOLIC ACID 1 MG/1
1 TABLET ORAL DAILY
COMMUNITY
Start: 2017-04-05

## 2023-05-12 RX ORDER — SPIRONOLACTONE 100 MG/1
100 TABLET, FILM COATED ORAL DAILY
COMMUNITY
Start: 2018-10-30

## 2024-04-30 ENCOUNTER — HOSPITAL ENCOUNTER (OUTPATIENT)
Facility: HOSPITAL | Age: 74
Setting detail: OUTPATIENT SURGERY
Discharge: HOME | End: 2024-04-30
Attending: INTERNAL MEDICINE | Admitting: INTERNAL MEDICINE
Payer: MEDICARE

## 2024-04-30 ENCOUNTER — ANESTHESIA EVENT (OUTPATIENT)
Facility: HOSPITAL | Age: 74
End: 2024-04-30
Payer: MEDICARE

## 2024-04-30 ENCOUNTER — ANESTHESIA (OUTPATIENT)
Facility: HOSPITAL | Age: 74
End: 2024-04-30
Payer: MEDICARE

## 2024-04-30 VITALS
TEMPERATURE: 97 F | WEIGHT: 179.5 LBS | BODY MASS INDEX: 25.13 KG/M2 | OXYGEN SATURATION: 97 % | RESPIRATION RATE: 14 BRPM | SYSTOLIC BLOOD PRESSURE: 113 MMHG | DIASTOLIC BLOOD PRESSURE: 72 MMHG | HEART RATE: 56 BPM | HEIGHT: 71 IN

## 2024-04-30 PROCEDURE — 6370000000 HC RX 637 (ALT 250 FOR IP): Performed by: INTERNAL MEDICINE

## 2024-04-30 PROCEDURE — 2500000003 HC RX 250 WO HCPCS: Performed by: NURSE ANESTHETIST, CERTIFIED REGISTERED

## 2024-04-30 PROCEDURE — 88305 TISSUE EXAM BY PATHOLOGIST: CPT

## 2024-04-30 PROCEDURE — 3600007501: Performed by: INTERNAL MEDICINE

## 2024-04-30 PROCEDURE — 2580000003 HC RX 258: Performed by: INTERNAL MEDICINE

## 2024-04-30 PROCEDURE — 3600007511: Performed by: INTERNAL MEDICINE

## 2024-04-30 PROCEDURE — 2709999900 HC NON-CHARGEABLE SUPPLY: Performed by: INTERNAL MEDICINE

## 2024-04-30 PROCEDURE — 3700000001 HC ADD 15 MINUTES (ANESTHESIA): Performed by: INTERNAL MEDICINE

## 2024-04-30 PROCEDURE — 3700000000 HC ANESTHESIA ATTENDED CARE: Performed by: INTERNAL MEDICINE

## 2024-04-30 PROCEDURE — 7100000011 HC PHASE II RECOVERY - ADDTL 15 MIN: Performed by: INTERNAL MEDICINE

## 2024-04-30 PROCEDURE — 6360000002 HC RX W HCPCS: Performed by: NURSE ANESTHETIST, CERTIFIED REGISTERED

## 2024-04-30 PROCEDURE — 7100000010 HC PHASE II RECOVERY - FIRST 15 MIN: Performed by: INTERNAL MEDICINE

## 2024-04-30 RX ORDER — SIMETHICONE 40MG/0.6ML
SUSPENSION, DROPS(FINAL DOSAGE FORM)(ML) ORAL PRN
Status: DISCONTINUED | OUTPATIENT
Start: 2024-04-30 | End: 2024-04-30 | Stop reason: ALTCHOICE

## 2024-04-30 RX ORDER — SODIUM CHLORIDE 0.9 % (FLUSH) 0.9 %
5-40 SYRINGE (ML) INJECTION EVERY 12 HOURS SCHEDULED
Status: DISCONTINUED | OUTPATIENT
Start: 2024-04-30 | End: 2024-04-30 | Stop reason: HOSPADM

## 2024-04-30 RX ORDER — SODIUM CHLORIDE 9 MG/ML
25 INJECTION, SOLUTION INTRAVENOUS PRN
Status: DISCONTINUED | OUTPATIENT
Start: 2024-04-30 | End: 2024-04-30 | Stop reason: HOSPADM

## 2024-04-30 RX ORDER — SODIUM CHLORIDE 0.9 % (FLUSH) 0.9 %
5-40 SYRINGE (ML) INJECTION PRN
Status: DISCONTINUED | OUTPATIENT
Start: 2024-04-30 | End: 2024-04-30 | Stop reason: HOSPADM

## 2024-04-30 RX ORDER — SODIUM CHLORIDE 9 MG/ML
INJECTION, SOLUTION INTRAVENOUS CONTINUOUS
Status: DISCONTINUED | OUTPATIENT
Start: 2024-04-30 | End: 2024-04-30 | Stop reason: HOSPADM

## 2024-04-30 RX ADMIN — LIDOCAINE HYDROCHLORIDE 50 MG: 20 INJECTION, SOLUTION EPIDURAL; INFILTRATION; INTRACAUDAL; PERINEURAL at 14:34

## 2024-04-30 RX ADMIN — PROPOFOL 50 MG: 10 INJECTION, EMULSION INTRAVENOUS at 14:47

## 2024-04-30 RX ADMIN — PROPOFOL 50 MG: 10 INJECTION, EMULSION INTRAVENOUS at 14:38

## 2024-04-30 RX ADMIN — SODIUM CHLORIDE: 9 INJECTION, SOLUTION INTRAVENOUS at 14:18

## 2024-04-30 RX ADMIN — PROPOFOL 75 MG: 10 INJECTION, EMULSION INTRAVENOUS at 14:34

## 2024-04-30 RX ADMIN — PROPOFOL 50 MG: 10 INJECTION, EMULSION INTRAVENOUS at 14:36

## 2024-04-30 RX ADMIN — PROPOFOL 50 MG: 10 INJECTION, EMULSION INTRAVENOUS at 14:43

## 2024-04-30 RX ADMIN — PROPOFOL 50 MG: 10 INJECTION, EMULSION INTRAVENOUS at 14:40

## 2024-04-30 ASSESSMENT — PAIN - FUNCTIONAL ASSESSMENT: PAIN_FUNCTIONAL_ASSESSMENT: NONE - DENIES PAIN

## 2024-04-30 NOTE — INTERVAL H&P NOTE
Pre-Endoscopy H&P Update  Chief complaint/HPI/ROS:  The indication for the procedure, the patient's history and the patient's current medications are reviewed prior to the procedure and that data is reported on the H&P to which this document is attached.  Any significant complaints with regard to organ systems will be noted, and if not mentioned then a review of systems is not contributory.  Past Medical History:   Diagnosis Date    Benign prostate hyperplasia     Conn's syndrome (HCC)     Effusion of right knee 10/23/2021    Enlarged prostate     Hypertension     Rheumatoid arthritis (HCC)     Right knee pain 10/23/2021    S/P right knee arthroscopy 10/23/2021    Skin cancer     Sun-damaged skin     Sunburn, blistering     Tendon rupture, traumatic. quadriceps, right, subsequent encounter 10/23/2021      Past Surgical History:   Procedure Laterality Date    COLONOSCOPY N/A 2/4/2019    COLONOSCOPY performed by Jose Elias Wang MD at [facility] END[facility]COPY    KNEE ARTHROSCOPY Right      Social   Social History     Tobacco Use    Smoking status: Never    Smokeless tobacco: Never   Substance Use Topics    Alcohol use: Yes     Alcohol/week: 1.0 standard drink of alcohol      Family History   Problem Relation Age of Onset    Asthma Mother     Asthma Father     Heart Disease Father       Allergies   Allergen Reactions    Sulfa Antibiotics      Other reaction(s): Unknown (comments)      Prior to Admission Medications   Prescriptions Last Dose Informant Patient Reported? Taking?   amLODIPine-olmesartan (KRISTINE) 10-20 MG per tablet   Yes No   Sig: amlodipine 10 mg-olmesartan 20 mg tablet   TAKE 1 TABLET BY MOUTH EVERY DAY   dutasteride (AVODART) 0.5 MG capsule   Yes No   Sig: Take 1 capsule by mouth daily   folic acid (FOLVITE) 1 MG tablet   Yes No   Sig: Take 1 tablet by mouth daily   methotrexate (RHEUMATREX) 2.5 MG chemo tablet   Yes No   Sig: TAKE 5 TABLETS BY MOUTH ONCE WEEKLY ON THE SAME DAY   spironolactone (ALDACTONE) 100 MG

## 2024-04-30 NOTE — PROGRESS NOTES

## 2024-04-30 NOTE — H&P
73 y.o. male presents for surveillance colonoscopy given personal history of colon polyps.  Additional H&P data will be attached on the day of procedure.    Emperatriz Gomez Jr, MD

## 2024-04-30 NOTE — DISCHARGE INSTRUCTIONS
[facility]  [facility]  LAYLA Vazquez Jr, MD  [phone]      April 30, 2024    Chaz Newsome Jr.  YOB: 1950    COLONOSCOPY DISCHARGE INSTRUCTIONS    If there is redness at IV site you should apply warm compress to area.  If redness or soreness persist contact Dr. Vazquez's office or your primary care doctor.    There may be a slight amount of blood passed from the rectum.  Gaseous discomfort may develop, but walking, belching will help relieve this.  You may not operate a vehicle for 12 hours  You may not operate machinery or dangerous appliances for rest of today  You may not drink alcoholic beverages for 12 hours  Avoid making any critical decisions for 24 hours    DIET:  You may resume your normal diet, but some patients find that heavy or large meals may lead to indigestion or vomiting.  I suggest a light meal as first food intake.    MEDICATIONS:  The use of some over-the-counter pain medication may lead to bleeding after colon biopsies or polyp removal.  Tylenol (also called acetaminophen) is safe to take even if you have had colonoscopy with polyp removal.  Based on the procedure you had today you may safely take aspirin or aspirin-like products for the next seven (7) days.  Remember that Tylenol (also called acetaminophen) is safe to take after colonoscopy even if you have had biopsies or polyps removed.    ACTIVITY:  You may resume your normal household activities, but it is recommended that you spend the remainder of the day resting -  avoid any strenuous activity.    CALL DR. VAZQUEZ'S OFFICE IF:  Increasing pain, nausea, vomiting  Abdominal distension (swelling)  Significant new or increased bleeding (oral or rectal)  Fever/Chills  Chest pain/shortness of breath                       Additional instructions:   Impression:  Descending colon polyp x 1, removed    Recommendations:   Await

## 2024-04-30 NOTE — OP NOTE
[facility]  [facility]  LAYLA Gomez Jr, MD  [phone]      2024    Colonoscopy Procedure Note  Chaz Newsome Jr.  :  1950  [facility] Medical Record Number: [id number]    Indications:   Personal history of colon polyps  PCP:  Bj Branham MD  Anesthesia/Sedation: See Anesthesia Record  Endoscopist:  Dr. LAYLA Gomez Jr  Complications:  None  Estimated Blood Loss:  None    Surgical assistant: Circulator: Cory Craven RN  Circulator Assist: Tootie Garrido RN     Implants none unless otherwise specified.     Permit:  The indications, risks, benefits and alternatives were reviewed with the patient or their decision maker who was provided an opportunity to ask questions and all questions were answered.  The specific risks of colonoscopy with conscious sedation were reviewed, including but not limited to anesthetic complication, bleeding, adverse drug reaction, missed lesion, infection, IV site reactions, and intestinal perforation which would lead to the need for surgical repair.  Alternatives to colonoscopy including radiographic imaging, observation without testing, or laboratory testing were reviewed including the limitations of those alternatives.  After considering the options and having all their questions answered, the patient or their decision maker provided both verbal and written consent to proceed.        Procedure in Detail:  After obtaining informed consent, positioning of the patient in the left lateral decubitus position, and conduction of a pre-procedure pause or \"time out\" the endoscope was introduced into the anus and advanced to the cecum, which was identified by the ileocecal valve and appendiceal orifice.  The quality of the colonic preparation was good.  A careful inspection was made as the colonoscope was withdrawn, findings and interventions are described

## 2024-04-30 NOTE — ANESTHESIA POSTPROCEDURE EVALUATION
Post-Anesthesia Evaluation and Assessment    Patient: Chaz Newsome Jr. MRN: [id number]  SSN: [id number]    YOB: 1950  Age: 73 y.o.  Sex: male      I have evaluated the patient and they are stable and ready for discharge from the PACU.     Cardiovascular Function/Vital Signs  Visit Vitals  /65   Pulse 66   Temp 97 °F (36.1 °C) (Temporal)   Resp 18   Ht 1.803 m (5' 11\")   Wt 81.4 kg (179 lb 8 oz)   SpO2 97%   BMI 25.04 kg/m²       Patient is status post Monitor Anesthesia Care anesthesia for Procedure(s):  COLONOSCOPY.    Nausea/Vomiting: None    Postoperative hydration reviewed and adequate.    Pain:      Managed    Neurological Status:       At baseline    Mental Status, Level of Consciousness: Alert and  oriented to person, place, and time    Pulmonary Status:       Adequate oxygenation and airway patent    Complications related to anesthesia: None    Post-anesthesia assessment completed. No concerns    Signed By: Carlos Diaz MD     April 30, 2024            Department of Anesthesiology  Postprocedure Note    Patient: Chaz Newsome Jr.  MRN: [id number]  YOB: 1950  Date of evaluation: 4/30/2024    Procedure Summary       Date: 04/30/24 Room / Location: [facility] ENDO 06 / [facility] ENDOSCOPY    Anesthesia Start: 1432 Anesthesia Stop: 1453    Procedure: COLONOSCOPY Diagnosis:       Personal history of colonic polyps      (Personal history of colonic polyps [Z86.010])    Surgeons: Emperatriz Gomez MD Responsible Provider: Carlos Diaz MD    Anesthesia Type: MAC ASA Status: 2            Anesthesia Type: MAC    Serg Phase I: Serg Score: 10    Serg Phase II: Serg Score: 9    Anesthesia Post Evaluation    No notable events documented.

## 2025-07-02 ENCOUNTER — ANESTHESIA EVENT (OUTPATIENT)
Facility: HOSPITAL | Age: 75
End: 2025-07-02
Payer: MEDICARE

## 2025-07-02 ENCOUNTER — HOSPITAL ENCOUNTER (OUTPATIENT)
Facility: HOSPITAL | Age: 75
Setting detail: OUTPATIENT SURGERY
Discharge: HOME | End: 2025-07-04
Attending: INTERNAL MEDICINE
Payer: MEDICARE

## 2025-07-02 ENCOUNTER — HOSPITAL ENCOUNTER (OUTPATIENT)
Facility: HOSPITAL | Age: 75
Setting detail: OUTPATIENT SURGERY
Discharge: HOME | End: 2025-07-02
Attending: INTERNAL MEDICINE | Admitting: INTERNAL MEDICINE
Payer: MEDICARE

## 2025-07-02 ENCOUNTER — ANESTHESIA (OUTPATIENT)
Facility: HOSPITAL | Age: 75
End: 2025-07-02
Payer: MEDICARE

## 2025-07-02 VITALS
WEIGHT: 187 LBS | SYSTOLIC BLOOD PRESSURE: 127 MMHG | RESPIRATION RATE: 23 BRPM | HEIGHT: 71 IN | OXYGEN SATURATION: 98 % | BODY MASS INDEX: 26.18 KG/M2 | DIASTOLIC BLOOD PRESSURE: 62 MMHG | TEMPERATURE: 98 F | HEART RATE: 93 BPM

## 2025-07-02 DIAGNOSIS — I48.92 ATRIAL FLUTTER (HCC): ICD-10-CM

## 2025-07-02 LAB
ACT BLD: 205 SECS (ref 79–138)
ECHO BSA: 2.06 M2
ECHO BSA: 2.06 M2

## 2025-07-02 PROCEDURE — 3700000001 HC ADD 15 MINUTES (ANESTHESIA): Performed by: INTERNAL MEDICINE

## 2025-07-02 PROCEDURE — 2580000003 HC RX 258: Performed by: INTERNAL MEDICINE

## 2025-07-02 PROCEDURE — C1760 CLOSURE DEV, VASC: HCPCS | Performed by: INTERNAL MEDICINE

## 2025-07-02 PROCEDURE — 2500000003 HC RX 250 WO HCPCS: Performed by: NURSE ANESTHETIST, CERTIFIED REGISTERED

## 2025-07-02 PROCEDURE — C1759 CATH, INTRA ECHOCARDIOGRAPHY: HCPCS | Performed by: INTERNAL MEDICINE

## 2025-07-02 PROCEDURE — 2720000010 HC SURG SUPPLY STERILE: Performed by: INTERNAL MEDICINE

## 2025-07-02 PROCEDURE — 2709999900 HC NON-CHARGEABLE SUPPLY: Performed by: INTERNAL MEDICINE

## 2025-07-02 PROCEDURE — 85347 COAGULATION TIME ACTIVATED: CPT

## 2025-07-02 PROCEDURE — 6360000002 HC RX W HCPCS: Performed by: NURSE ANESTHETIST, CERTIFIED REGISTERED

## 2025-07-02 PROCEDURE — C1766 INTRO/SHEATH,STRBLE,NON-PEEL: HCPCS | Performed by: INTERNAL MEDICINE

## 2025-07-02 PROCEDURE — C1894 INTRO/SHEATH, NON-LASER: HCPCS | Performed by: INTERNAL MEDICINE

## 2025-07-02 PROCEDURE — 6370000000 HC RX 637 (ALT 250 FOR IP): Performed by: NURSE ANESTHETIST, CERTIFIED REGISTERED

## 2025-07-02 PROCEDURE — 93005 ELECTROCARDIOGRAM TRACING: CPT

## 2025-07-02 PROCEDURE — C1732 CATH, EP, DIAG/ABL, 3D/VECT: HCPCS | Performed by: INTERNAL MEDICINE

## 2025-07-02 PROCEDURE — 3700000000 HC ANESTHESIA ATTENDED CARE: Performed by: INTERNAL MEDICINE

## 2025-07-02 PROCEDURE — 2580000003 HC RX 258: Performed by: NURSE ANESTHETIST, CERTIFIED REGISTERED

## 2025-07-02 PROCEDURE — 6360000002 HC RX W HCPCS: Performed by: INTERNAL MEDICINE

## 2025-07-02 PROCEDURE — 2500000003 HC RX 250 WO HCPCS: Performed by: INTERNAL MEDICINE

## 2025-07-02 PROCEDURE — 93325 DOPPLER ECHO COLOR FLOW MAPG: CPT

## 2025-07-02 RX ORDER — MIDAZOLAM HYDROCHLORIDE 1 MG/ML
INJECTION, SOLUTION INTRAMUSCULAR; INTRAVENOUS
Status: DISCONTINUED | OUTPATIENT
Start: 2025-07-02 | End: 2025-07-02 | Stop reason: SDUPTHER

## 2025-07-02 RX ORDER — DEXAMETHASONE SODIUM PHOSPHATE 4 MG/ML
INJECTION, SOLUTION INTRA-ARTICULAR; INTRALESIONAL; INTRAMUSCULAR; INTRAVENOUS; SOFT TISSUE
Status: DISCONTINUED | OUTPATIENT
Start: 2025-07-02 | End: 2025-07-02 | Stop reason: SDUPTHER

## 2025-07-02 RX ORDER — FENTANYL CITRATE 50 UG/ML
INJECTION, SOLUTION INTRAMUSCULAR; INTRAVENOUS
Status: DISCONTINUED | OUTPATIENT
Start: 2025-07-02 | End: 2025-07-02 | Stop reason: SDUPTHER

## 2025-07-02 RX ORDER — CARVEDILOL 6.25 MG/1
6.25 TABLET ORAL 2 TIMES DAILY
Qty: 60 TABLET | Refills: 3 | Status: SHIPPED | OUTPATIENT
Start: 2025-07-02

## 2025-07-02 RX ORDER — PROTAMINE SULFATE 10 MG/ML
INJECTION, SOLUTION INTRAVENOUS
Status: DISCONTINUED | OUTPATIENT
Start: 2025-07-02 | End: 2025-07-02 | Stop reason: SDUPTHER

## 2025-07-02 RX ORDER — SODIUM CHLORIDE 9 MG/ML
INJECTION, SOLUTION INTRAVENOUS
Status: DISCONTINUED | OUTPATIENT
Start: 2025-07-02 | End: 2025-07-02 | Stop reason: SDUPTHER

## 2025-07-02 RX ORDER — DEXMEDETOMIDINE HYDROCHLORIDE 100 UG/ML
INJECTION, SOLUTION INTRAVENOUS
Status: DISCONTINUED | OUTPATIENT
Start: 2025-07-02 | End: 2025-07-02 | Stop reason: SDUPTHER

## 2025-07-02 RX ORDER — HEPARIN SODIUM 1000 [USP'U]/ML
INJECTION, SOLUTION INTRAVENOUS; SUBCUTANEOUS
Status: DISCONTINUED | OUTPATIENT
Start: 2025-07-02 | End: 2025-07-02 | Stop reason: SDUPTHER

## 2025-07-02 RX ORDER — LIDOCAINE HYDROCHLORIDE 10 MG/ML
INJECTION, SOLUTION INFILTRATION; PERINEURAL PRN
Status: DISCONTINUED | OUTPATIENT
Start: 2025-07-02 | End: 2025-07-02 | Stop reason: HOSPADM

## 2025-07-02 RX ORDER — ONDANSETRON 2 MG/ML
INJECTION INTRAMUSCULAR; INTRAVENOUS
Status: DISCONTINUED | OUTPATIENT
Start: 2025-07-02 | End: 2025-07-02 | Stop reason: SDUPTHER

## 2025-07-02 RX ADMIN — HEPARIN SODIUM 3000 UNITS: 1000 INJECTION, SOLUTION INTRAVENOUS; SUBCUTANEOUS at 11:00

## 2025-07-02 RX ADMIN — MIDAZOLAM HYDROCHLORIDE 2 MG: 1 INJECTION, SOLUTION INTRAMUSCULAR; INTRAVENOUS at 10:27

## 2025-07-02 RX ADMIN — BENZOCAINE 2 SPRAY: 200 SPRAY DENTAL; ORAL; PERIODONTAL at 10:27

## 2025-07-02 RX ADMIN — ONDANSETRON HYDROCHLORIDE 4 MG: 2 INJECTION, SOLUTION INTRAMUSCULAR; INTRAVENOUS at 10:41

## 2025-07-02 RX ADMIN — Medication 2 MCG/MIN: at 10:46

## 2025-07-02 RX ADMIN — PROPOFOL 50 MG: 10 INJECTION, EMULSION INTRAVENOUS at 10:35

## 2025-07-02 RX ADMIN — DEXAMETHASONE SODIUM PHOSPHATE 4 MG: 4 INJECTION, SOLUTION INTRAMUSCULAR; INTRAVENOUS at 10:43

## 2025-07-02 RX ADMIN — SODIUM CHLORIDE: 9 INJECTION, SOLUTION INTRAVENOUS at 10:27

## 2025-07-02 RX ADMIN — PROPOFOL 100 MCG/KG/MIN: 10 INJECTION, EMULSION INTRAVENOUS at 10:34

## 2025-07-02 RX ADMIN — HEPARIN SODIUM 3000 UNITS: 1000 INJECTION, SOLUTION INTRAVENOUS; SUBCUTANEOUS at 11:31

## 2025-07-02 RX ADMIN — FENTANYL CITRATE 50 MCG: 50 INJECTION, SOLUTION INTRAMUSCULAR; INTRAVENOUS at 10:34

## 2025-07-02 RX ADMIN — PROPOFOL 50 MG: 10 INJECTION, EMULSION INTRAVENOUS at 10:38

## 2025-07-02 RX ADMIN — HEPARIN SODIUM 5000 UNITS: 1000 INJECTION, SOLUTION INTRAVENOUS; SUBCUTANEOUS at 11:21

## 2025-07-02 RX ADMIN — DEXMEDETOMIDINE 3 MCG: 100 INJECTION, SOLUTION INTRAVENOUS at 10:30

## 2025-07-02 RX ADMIN — LIDOCAINE HYDROCHLORIDE 50 MG: 20 INJECTION, SOLUTION EPIDURAL; INFILTRATION; INTRACAUDAL; PERINEURAL at 10:34

## 2025-07-02 RX ADMIN — PROTAMINE SULFATE 70 MG: 10 INJECTION, SOLUTION INTRAVENOUS at 11:38

## 2025-07-02 NOTE — PROGRESS NOTES
Patent walked the potts of recovery area. No signs or symptoms of SOB or distress.    I have reviewed discharge instructions with the patient.  The patient verbalized understanding.    Discharge medications reviewed with patient and appropriate educational materials regarding medications and side effects teaching were provided.    Site care instructions reviewed with patient. Pain management teaching completed.    Patient instructed to make follow up appointments per discharge instructions.     Patient belongings packed up and accounted for with patient and family. All patient belongings sent home with patient.    Telemetry monitor and wires removed      Patient signed discharge instructions after reviewing them, and duplicate copy placed in chart.     Pt discharged home with Rochelle, daughter

## 2025-07-02 NOTE — PROGRESS NOTES
Patient arrived to Cardiac Cath Lab Recovery Area.  Staff introduced to patient.  Patient identifiers verified with NAME AND DATE OF BIRTH.  Procedure verified with patient.  Consent forms reviewed and signed by patient or authorized representative and verified.  Allergies verified.    Patient and family oriented to department.  Patient and family informed of procedure and plan of care.      Questions answered with review.  Patient prepped for procedure, per orders from physician, prior to arrival.      Patient on cardiac monitor, non-invasive blood pressure, SPO2 monitor.  On on room air.  Patient A&Ox 4.  Patient reports no pain.    Patient in stretcher, stretcher in lowest position with side rails up, call bell within reach.  Patient instructed to call if assistance is needed.    Patient prep in:  [facility] Recovery Area: [place] [address]    Prepped by: KAMAR Ballesteros, Zoey, RN, and Jyoti RN

## 2025-07-02 NOTE — ANESTHESIA POSTPROCEDURE EVALUATION
Department of Anesthesiology  Postprocedure Note    Patient: Chaz Newsome Jr.  MRN: [id number]  YOB: 1950  Date of evaluation: 7/2/2025    Procedure Summary       Date: 07/02/25 Room / Location: [facility] EP LAB / [facility] CARDIAC CATH LAB    Anesthesia Start: 1027 Anesthesia Stop: 1204    Procedure: Ablation A-flutter Diagnosis:       Typical atrial flutter (HCC)      (Atrial flutter (HCC) [I48.92])    Providers: Jose Oropeza MD Responsible Provider: Ryan Kam MD    Anesthesia Type: MAC ASA Status: 2            Anesthesia Type: MAC    Serg Phase I: Serg Score: 10    Serg Phase II:      Anesthesia Post Evaluation    Patient location during evaluation: PACU  Patient participation: complete - patient participated  Level of consciousness: responsive to verbal stimuli and sleepy but conscious  Pain score: 2  Airway patency: patent  Cardiovascular status: blood pressure returned to baseline  Respiratory status: acceptable  Hydration status: stable  Comments: +Post-Anesthesia Evaluation and Assessment    Patient: Chaz Newsome Jr. MRN: [id number]  SSN: [id number]   YOB: 1950  Age: 75 y.o.  Sex: male          Cardiovascular Function/Vital Signs    /77   Pulse 96   Temp 98 °F (36.7 °C)   Resp 19   Ht 1.803 m (5' 11\")   Wt 84.8 kg (187 lb)   SpO2 91%   BMI 26.08 kg/m²     Patient is status post Procedure(s):  Ablation A-flutter.    Nausea/Vomiting: Controlled.    Postoperative hydration reviewed and adequate.    Pain:      Managed.    Neurological Status:       At baseline.    Mental Status and Level of Consciousness: Arousable.    Pulmonary Status:       Adequate oxygenation and airway patent.    Complications related to anesthesia: None    Post-anesthesia assessment completed. No concerns.    I have evaluated the patient and the patient is stable and ready to be discharged from PACU .    Signed By: Ryan Kam MD    7/2/2025    Multimodal analgesia pain management

## 2025-07-02 NOTE — ANESTHESIA PRE PROCEDURE
Department of Anesthesiology  Preprocedure Note       Name:  Chaz Newsome Jr.   Age:  75 y.o.  :  1950                                          MRN:  [id number]         Date:  2025      Surgeon: Surgeon(s):  Jose Oropeza MD    Procedure: Procedure(s):  Ablation A-flutter    Medications prior to admission:   Prior to Admission medications    Medication Sig Start Date End Date Taking? Authorizing Provider   apixaban (ELIQUIS) 5 MG TABS tablet Take by mouth 2 times daily   Yes Provider, MD Hremilo   amLODIPine-olmesartan (KRISTINE) 10-20 MG per tablet amlodipine 10 mg-olmesartan 20 mg tablet   TAKE 1 TABLET BY MOUTH EVERY DAY   Yes Automatic Reconciliation, Ar   dutasteride (AVODART) 0.5 MG capsule Take 1 capsule by mouth daily   Yes Automatic Reconciliation, Ar   folic acid (FOLVITE) 1 MG tablet Take 1 tablet by mouth daily 17  Yes Automatic Reconciliation, Ar   spironolactone (ALDACTONE) 100 MG tablet Take 1 tablet by mouth daily 10/30/18  Yes Automatic Reconciliation, Ar   methotrexate (RHEUMATREX) 2.5 MG chemo tablet TAKE 5 TABLETS BY MOUTH ONCE WEEKLY ON THE SAME DAY 21   Automatic Reconciliation, Ar       Current medications:    No current facility-administered medications for this encounter.       Allergies:    Allergies   Allergen Reactions   • Sulfa Antibiotics      Other reaction(s): Unknown (comments)       Problem List:    Patient Active Problem List   Diagnosis Code   • Right knee pain M25.561   • Tendon rupture, traumatic. quadriceps, right, subsequent encounter S76.111D   • Effusion of right knee M25.461   • S/P right knee arthroscopy Z98.890       Past Medical History:        Diagnosis Date   • Benign prostate hyperplasia    • Conn's syndrome    • Effusion of right knee 10/23/2021   • Enlarged prostate    • Hypertension    • Rheumatoid arthritis (HCC)    • Right knee pain 10/23/2021   • S/P right knee arthroscopy 10/23/2021   • Skin cancer    • Sun-damaged skin    • Sunburn,

## 2025-07-03 LAB
EKG ATRIAL RATE: 298 BPM
EKG DIAGNOSIS: NORMAL
EKG P AXIS: 100 DEGREES
EKG Q-T INTERVAL: 334 MS
EKG QRS DURATION: 132 MS
EKG QTC CALCULATION (BAZETT): 526 MS
EKG R AXIS: -61 DEGREES
EKG T AXIS: -70 DEGREES
EKG VENTRICULAR RATE: 149 BPM

## 2025-07-14 LAB — ECHO BSA: 2.06 M2

## (undated) DEVICE — NEEDLE HYPO 18GA L1.5IN PNK S STL HUB POLYPR SHLD REG BVL

## (undated) DEVICE — TRAP SURG QUAD PARABOLA SLOT DSGN SFTY SCRN TRAPEASE

## (undated) DEVICE — CONTAINER SPEC 20 ML LID NEUT BUFF FORMALIN 10 % POLYPR STS

## (undated) DEVICE — NEONATAL-ADULT SPO2 SENSOR: Brand: NELLCOR

## (undated) DEVICE — KENDALL RADIOLUCENT FOAM MONITORING ELECTRODE -RECTANGULAR SHAPE: Brand: KENDALL

## (undated) DEVICE — ENDO CARRY-ON PROCEDURE KIT INCLUDES ENZYMATIC SPONGE, GAUZE, BIOHAZARD LABEL, TRAY, LUBRICANT, DIRTY SCOPE LABEL, WATER LABEL, TRAY, DRAWSTRING PAD, AND DEFENDO 4-PIECE KIT.: Brand: ENDO CARRY-ON PROCEDURE KIT

## (undated) DEVICE — CONNECTOR TBNG AUX H2O JET DISP FOR OLY 160/180 SER

## (undated) DEVICE — Z DISCONTINUED USE 2751540 TUBING IRRIG L10IN DISP PMP ENDOGATOR

## (undated) DEVICE — SOLIDIFIER FLUID 3000 CC ABSORB

## (undated) DEVICE — SNARE ENDOSCP L240CM OD24MM LOOP W10MM RND INSUL STIFF BRAID

## (undated) DEVICE — SYRINGE MED 20ML STD CLR PLAS LUERLOCK TIP N CTRL DISP

## (undated) DEVICE — Device: Brand: MEDICAL ACTION INDUSTRIES

## (undated) DEVICE — Z DISCONTINUED NO SUB IDED SET EXTN W/ 4 W STPCOCK M SPIN LOK 36IN

## (undated) DEVICE — 1200 GUARD II KIT W/5MM TUBE W/O VAC TUBE: Brand: GUARDIAN

## (undated) DEVICE — SNARE ENDOSCP M L240CM W27MM SHTH DIA2.4MM CHN 2.8MM OVL

## (undated) DEVICE — BW-412T DISP COMBO CLEANING BRUSH: Brand: SINGLE USE COMBINATION CLEANING BRUSH

## (undated) DEVICE — AIRLIFE™ U/CONNECT-IT OXYGEN TUBING 7 FEET (2.1 M) CRUSH-RESISTANT OXYGEN TUBING, VINYL TIPPED: Brand: AIRLIFE™

## (undated) DEVICE — CATH IV AUTOGRD BC BLU 22GA 25 -- INSYTE

## (undated) DEVICE — SET ADMIN 16ML TBNG L100IN 2 Y INJ SITE IV PIGGY BK DISP

## (undated) DEVICE — QUILTED PREMIUM COMFORT UNDERPAD,EXTRA HEAVY: Brand: WINGS

## (undated) DEVICE — BAG BELONG PT PERS CLEAR HANDL

## (undated) DEVICE — BAG SPEC BIOHZD LF 2MIL 6X10IN -- CONVERT TO ITEM 357326

## (undated) DEVICE — Device